# Patient Record
Sex: MALE | Race: WHITE | HISPANIC OR LATINO | URBAN - METROPOLITAN AREA
[De-identification: names, ages, dates, MRNs, and addresses within clinical notes are randomized per-mention and may not be internally consistent; named-entity substitution may affect disease eponyms.]

---

## 2022-12-27 ENCOUNTER — OFFICE VISIT (OUTPATIENT)
Dept: URGENT CARE | Facility: URGENT CARE | Age: 47
End: 2022-12-27
Payer: COMMERCIAL

## 2022-12-27 VITALS
WEIGHT: 134 LBS | HEART RATE: 77 BPM | OXYGEN SATURATION: 98 % | DIASTOLIC BLOOD PRESSURE: 94 MMHG | SYSTOLIC BLOOD PRESSURE: 156 MMHG | RESPIRATION RATE: 16 BRPM | TEMPERATURE: 98 F

## 2022-12-27 DIAGNOSIS — J10.1 INFLUENZA B: Primary | ICD-10-CM

## 2022-12-27 DIAGNOSIS — J06.9 VIRAL URI: ICD-10-CM

## 2022-12-27 LAB
DEPRECATED S PYO AG THROAT QL EIA: NEGATIVE
FLUAV AG SPEC QL IA: NEGATIVE
FLUBV AG SPEC QL IA: POSITIVE
GROUP A STREP BY PCR: NOT DETECTED
SARS-COV-2 RNA RESP QL NAA+PROBE: NEGATIVE

## 2022-12-27 PROCEDURE — 87651 STREP A DNA AMP PROBE: CPT | Performed by: FAMILY MEDICINE

## 2022-12-27 PROCEDURE — 87804 INFLUENZA ASSAY W/OPTIC: CPT | Performed by: FAMILY MEDICINE

## 2022-12-27 PROCEDURE — U0003 INFECTIOUS AGENT DETECTION BY NUCLEIC ACID (DNA OR RNA); SEVERE ACUTE RESPIRATORY SYNDROME CORONAVIRUS 2 (SARS-COV-2) (CORONAVIRUS DISEASE [COVID-19]), AMPLIFIED PROBE TECHNIQUE, MAKING USE OF HIGH THROUGHPUT TECHNOLOGIES AS DESCRIBED BY CMS-2020-01-R: HCPCS | Performed by: FAMILY MEDICINE

## 2022-12-27 PROCEDURE — 99203 OFFICE O/P NEW LOW 30 MIN: CPT | Performed by: FAMILY MEDICINE

## 2022-12-27 PROCEDURE — U0005 INFEC AGEN DETEC AMPLI PROBE: HCPCS | Performed by: FAMILY MEDICINE

## 2022-12-27 RX ORDER — BENZONATATE 200 MG/1
200 CAPSULE ORAL 3 TIMES DAILY PRN
Qty: 21 CAPSULE | Refills: 0 | Status: SHIPPED | OUTPATIENT
Start: 2022-12-27 | End: 2023-01-03

## 2022-12-27 NOTE — PROGRESS NOTES
SUBJECTIVE: Enrique Howe is a 47 year old male presenting with a chief complaint of nasal congestion and cough .  Onset of symptoms was 1 week(s) ago.    No past medical history on file.  Not on File  Social History     Tobacco Use     Smoking status: Not on file     Smokeless tobacco: Not on file   Substance Use Topics     Alcohol use: Not on file       ROS:  SKIN: no rash  GI: no vomiting    OBJECTIVE:  BP (!) 156/94   Pulse 77   Temp 98  F (36.7  C) (Oral)   Resp 16   Wt 60.8 kg (134 lb)   SpO2 98% GENERAL APPEARANCE: healthy, alert and no distress  EYES: EOMI,  PERRL, conjunctiva clear  HENT: ear canals and TM's normal.  Nose and mouth without ulcers, erythema or lesions  RESP: lungs clear to auscultation - no rales, rhonchi or wheezes  SKIN: no suspicious lesions or rashes      ICD-10-CM    1. Influenza B  J10.1 benzonatate (TESSALON) 200 MG capsule      2. Viral URI  J06.9 Symptomatic COVID-19 Virus (Coronavirus) by PCR Nose     Influenza A & B Antigen - Clinic Collect     Streptococcus A Rapid Screen w/Reflex to PCR - Clinic Collect     Group A Streptococcus PCR Throat Swab          Fluids/Rest, f/u if worse/not any better

## 2025-07-07 ENCOUNTER — OFFICE VISIT (OUTPATIENT)
Dept: URGENT CARE | Facility: URGENT CARE | Age: 50
End: 2025-07-07
Payer: COMMERCIAL

## 2025-07-07 VITALS
OXYGEN SATURATION: 98 % | SYSTOLIC BLOOD PRESSURE: 126 MMHG | TEMPERATURE: 100 F | HEART RATE: 103 BPM | RESPIRATION RATE: 18 BRPM | DIASTOLIC BLOOD PRESSURE: 79 MMHG | WEIGHT: 154.2 LBS

## 2025-07-07 DIAGNOSIS — R50.9 FEVER IN ADULT: ICD-10-CM

## 2025-07-07 DIAGNOSIS — J02.9 ACUTE PHARYNGITIS, UNSPECIFIED ETIOLOGY: Primary | ICD-10-CM

## 2025-07-07 LAB
DEPRECATED S PYO AG THROAT QL EIA: NEGATIVE
S PYO DNA THROAT QL NAA+PROBE: NOT DETECTED

## 2025-07-07 PROCEDURE — 3074F SYST BP LT 130 MM HG: CPT

## 2025-07-07 PROCEDURE — 87635 SARS-COV-2 COVID-19 AMP PRB: CPT

## 2025-07-07 PROCEDURE — 3078F DIAST BP <80 MM HG: CPT

## 2025-07-07 PROCEDURE — 87651 STREP A DNA AMP PROBE: CPT

## 2025-07-07 PROCEDURE — 99213 OFFICE O/P EST LOW 20 MIN: CPT

## 2025-07-07 RX ORDER — AMOXICILLIN 500 MG/1
500 CAPSULE ORAL 2 TIMES DAILY
Qty: 20 CAPSULE | Refills: 0 | Status: SHIPPED | OUTPATIENT
Start: 2025-07-07 | End: 2025-07-07

## 2025-07-07 RX ORDER — AMOXICILLIN 500 MG/1
1000 CAPSULE ORAL DAILY
Qty: 20 CAPSULE | Refills: 0 | Status: SHIPPED | OUTPATIENT
Start: 2025-07-07

## 2025-07-07 RX ORDER — AMOXICILLIN 500 MG/1
1000 CAPSULE ORAL DAILY
Qty: 20 CAPSULE | Refills: 0 | Status: SHIPPED | OUTPATIENT
Start: 2025-07-07 | End: 2025-07-07

## 2025-07-07 NOTE — LETTER
2025    Enrique Hwoe   1975        To Whom it May Concern;    Please excuse Enrique Howe from work/school for a healthcare visit on 2025. Additionally, please excuse him from work so that he can recover from his illness. I would allow him to return to work on 7/10.     Call with any questions or concerns.       Sincerely,          Jose Groves, DO

## 2025-07-07 NOTE — PATIENT INSTRUCTIONS
Thank you for trusting us with your care. As we discussed;    1)  amoxicillin, take 2 tablets once a day (with breakfast) for 10 days.    2) We will call with the COVID test result    3) Take tylenol 1000mg every 8 hours, or Ibuprofen 600mg every 8 hours - Can alternate, take tylenol and then 4 hours later take ibuprofen and then 4 hours late take tylenol    4) If you are not improving 24-48 hours after starting antibiotics, please be seen again.    Please feel free to call the clinic with any questions or concerns, or send a [employer] message and we will get back to you as soon as we can.     Jose Groves, DO

## 2025-07-07 NOTE — PROGRESS NOTES
"Assessment & Plan     Acute pharyngitis, unspecified etiology  Fever in adult  Cough  Erythematous posterior oropharynx with exudates present, rapid strep negative however given clinical suspicion we will treat as acute pharyngitis with amoxicillin.  Provided return precautions and conservative therapies and patient instructions.  - Streptococcus A Rapid Screen w/Reflex to PCR - Clinic Collect  - COVID-19 Virus (Coronavirus) by PCR Nose  - Group A Streptococcus PCR Throat Swab    - amoxicillin (AMOXIL) 500 MG capsule  Dispense: 20 capsule; Refill: 0       Return if symptoms worsen or fail to improve, for Follow up.    Jose Groves DO  [facility] [place] Nunez is a 50 year old male who presents to clinic today for the following health issues:  Chief Complaint   Patient presents with    Urgent Care    Cough     Pt states he was working under the heat outside Thursday as he is a . Since pt has been feeling ill, running a fever, having upper back pain, slight cough, and HA. Tx otc- Tylenol         7/7/2025     4:18 PM   Additional Questions   Roomed by Dawb   Accompanied by Spouse         7/7/2025   Forms   Any forms needing to be completed Yes     HPI    URI Adult    Onset of symptoms was 4 day(s) ago.  Course of illness is worsening.    Severity moderate  Current and Associated symptoms: sore throat, body aches, and fatigue  Treatment measures tried include Tylenol/Ibuprofen, Fluids, and Rest.  Predisposing factors include None.    Started Thursday, then felt better this morning with resolution of fever, then went back to work and fever returned  Was feeling better, now feeling worse   Appetite has worsened alongside fever  Denies sore throat (maybe a little bit today), no cough, no runny nose  Ears feel \"warm\" but no pain  Worst symptoms is feeling weak          Objective    BP (!) 150/76   Pulse 103   Temp (!) 101.7  F (38.7  C) (Tympanic)   Resp 18   Wt " 69.9 kg (154 lb 3.2 oz)   SpO2 98%   Physical Exam   GENERAL: alert and no distress  HENT: Erythematous posterior oropharynx with exudates.  Ear canals and TM's normal, nose and mouth without ulcers or lesions  NECK: no adenopathy, no asymmetry, masses, or scars  RESP: lungs clear to auscultation - no rales, rhonchi or wheezes  CV: regular rate and rhythm, normal S1 S2, no S3 or S4, no murmur, click or rub, no peripheral edema  ABDOMEN: soft, nontender, no hepatosplenomegaly, no masses and bowel sounds normal  MS: no gross musculoskeletal defects noted, no edema

## 2025-07-07 NOTE — PROGRESS NOTES
Urgent Care Clinic Visit    Chief Complaint   Patient presents with    Urgent Care    Cough     Pt states he was working under the heat outside Thursday as he is a . Since pt has been feeling ill, running a fever, having upper back pain, slight cough, and HA. Tx otc- Tylenol              7/7/2025     4:18 PM   Additional Questions   Roomed by Ck   Accompanied by Spouse         7/7/2025   Forms   Any forms needing to be completed Yes     Does the patient have a sore throat and either history of fever >100.4 in the previous 24 hours or recent exposure to a known case of strep throat? Yes  Does the patient have a productive cough that started within the past 7 days? No

## 2025-07-08 LAB — SARS-COV-2 RNA RESP QL NAA+PROBE: NEGATIVE

## 2025-07-10 ENCOUNTER — OFFICE VISIT (OUTPATIENT)
Dept: URGENT CARE | Facility: URGENT CARE | Age: 50
End: 2025-07-10
Payer: COMMERCIAL

## 2025-07-10 ENCOUNTER — ANCILLARY PROCEDURE (OUTPATIENT)
Dept: GENERAL RADIOLOGY | Facility: CLINIC | Age: 50
End: 2025-07-10
Attending: PHYSICIAN ASSISTANT
Payer: COMMERCIAL

## 2025-07-10 VITALS
OXYGEN SATURATION: 95 % | WEIGHT: 151.5 LBS | TEMPERATURE: 98.8 F | DIASTOLIC BLOOD PRESSURE: 90 MMHG | SYSTOLIC BLOOD PRESSURE: 151 MMHG | RESPIRATION RATE: 16 BRPM | HEART RATE: 94 BPM

## 2025-07-10 DIAGNOSIS — J18.9 PNEUMONIA OF RIGHT MIDDLE LOBE DUE TO INFECTIOUS ORGANISM: Primary | ICD-10-CM

## 2025-07-10 DIAGNOSIS — R05.1 ACUTE COUGH: ICD-10-CM

## 2025-07-10 RX ORDER — AZITHROMYCIN 250 MG/1
TABLET, FILM COATED ORAL
Qty: 6 TABLET | Refills: 0 | Status: SHIPPED | OUTPATIENT
Start: 2025-07-10 | End: 2025-07-15

## 2025-07-10 ASSESSMENT — ENCOUNTER SYMPTOMS
FEVER: 1
COUGH: 1

## 2025-07-10 NOTE — LETTER
July 10, 2025      Enrique Howe  [address] [facility][[address]] [address]        To Whom It May Concern:    Enrique Howe  was seen on 07/10/25.  Please excuse him from work until 07/15/2025 due to illness.        Sincerely,        Saundra Bro PA-C    Electronically signed

## 2025-07-10 NOTE — PROGRESS NOTES
Urgent Care Clinic Visit    Chief Complaint   Patient presents with    Urgent Care    Fever     Fever, x3 days. Was seen Monday for high fever, got amoxicillin and still having fever, ibuprofen and tylenol as needed every 4 hours.               7/10/2025     4:41 PM   Additional Questions   Roomed by Marcie Bullock   Accompanied by wife

## 2025-07-10 NOTE — PROGRESS NOTES
Assessment & Plan        1. Pneumonia of right middle lobe due to infectious organism (Primary)    -Advised patient to stop taking amoxicillin.  He will start Augmentin twice daily for 7 days and azithromycin as directed.  Patient follow-up in the clinic if symptoms are not getting better in the next 2-3 days  - azithromycin (ZITHROMAX) 250 MG tablet; Take 2 tablets (500 mg) by mouth daily for 1 day, THEN 1 tablet (250 mg) daily for 4 days.  Dispense: 6 tablet; Refill: 0  - amoxicillin-clavulanate (AUGMENTIN) 875-125 MG tablet; Take 1 tablet by mouth 2 times daily for 7 days.  Dispense: 14 tablet; Refill: 0    2. Acute cough    - XR Chest 2 Views pneumonia of the right middle lobe per radiology read    Results for orders placed or performed in visit on 07/10/25   XR Chest 2 Views     Status: None    Narrative    EXAM: XR CHEST 2 VIEWS  LOCATION: [facility] [place]  DATE: 7/10/2025    INDICATION: cough and fever  COMPARISON: None.      Impression    IMPRESSION: Large area of consolidation in the right middle lobe typical of pneumonia. Follow-up to resolution recommended. Left lung clear. No effusions.     '  Patient Instructions   Expect to see improvement of symptoms in 2-3 after starting antibiotics. Follow up in the clinic or ED if symptoms worsen. Complete resolution of symptoms expected after 7 days      Return if symptoms worsen or fail to improve, for Follow up.    At the end of the encounter, I discussed results, diagnosis, medications. Discussed red flags for immediate return to clinic/ER, as well as indications for follow up if no improvement. Patient and wife understood and agreed to plan. Patient was stable for discharge.    Pako Nunez is a 50 year old male who presents to clinic today with wife for the following health issues:  Chief Complaint   Patient presents with    Urgent Care    Fever     Fever, x3 days, unproductive cough x1day. Was seen Monday for high fever, got  amoxicillin and still having fever, ibuprofen and tylenol as needed every 4 hours.      [facility]    IInterpretation services were used during the visit.  Patient reports a 3-day history of fever, initially evaluated on Monday. Temp was 101.5 F at home. At that visit, he was prescribed amoxicillin 1000 mg daily for 10 days. Both rapid and PCR strep tests were negative. He reports that the fever briefly improved for one day but has since returned and persisted, although he is afebrile today. He now reports a new unproductive cough that started yesterday. He continues to have a sore throat. A COVID-19 test was negative. He is requesting evaluation of his lungs.    Review of Systems   Constitutional:  Positive for fever.   Respiratory:  Positive for cough.        Problem List:  There are no relevant problems documented for this patient.      No past medical history on file.    Social History     Tobacco Use    Smoking status: Never    Smokeless tobacco: Never   Substance Use Topics    Alcohol use: Not on file           Objective    BP (!) 151/90   Pulse 94   Temp 98.8  F (37.1  C) (Tympanic)   Resp 16   Wt 68.7 kg (151 lb 8 oz)   SpO2 95%   Physical Exam  Constitutional:       Appearance: Normal appearance.   HENT:      Head: Normocephalic.      Mouth/Throat:      Mouth: Mucous membranes are moist.      Pharynx: Uvula midline. Posterior oropharyngeal erythema present.   Cardiovascular:      Rate and Rhythm: Normal rate and regular rhythm.   Pulmonary:      Effort: Pulmonary effort is normal.      Breath sounds: Normal breath sounds.   Lymphadenopathy:      Head:      Right side of head: No submental, submandibular or tonsillar adenopathy.      Left side of head: No submental, submandibular or tonsillar adenopathy.      Cervical: No cervical adenopathy.      Right cervical: No superficial cervical adenopathy.     Left cervical: No superficial cervical adenopathy.   Skin:     General: Skin is warm and dry.      Findings:  No rash.   Neurological:      Mental Status: He is alert.   Psychiatric:         Mood and Affect: Mood normal.         Behavior: Behavior normal.              Saundra Bro PA-C

## 2025-07-15 ENCOUNTER — OFFICE VISIT (OUTPATIENT)
Dept: URGENT CARE | Facility: URGENT CARE | Age: 50
End: 2025-07-15
Payer: COMMERCIAL

## 2025-07-15 VITALS
OXYGEN SATURATION: 98 % | DIASTOLIC BLOOD PRESSURE: 79 MMHG | RESPIRATION RATE: 18 BRPM | HEART RATE: 87 BPM | SYSTOLIC BLOOD PRESSURE: 140 MMHG | WEIGHT: 143.7 LBS | TEMPERATURE: 99.1 F

## 2025-07-15 DIAGNOSIS — J18.9 PNEUMONIA OF RIGHT MIDDLE LOBE DUE TO INFECTIOUS ORGANISM: Primary | ICD-10-CM

## 2025-07-15 DIAGNOSIS — R19.7 DIARRHEA, UNSPECIFIED TYPE: ICD-10-CM

## 2025-07-15 PROCEDURE — 99213 OFFICE O/P EST LOW 20 MIN: CPT

## 2025-07-15 PROCEDURE — T1013 SIGN LANG/ORAL INTERPRETER: HCPCS

## 2025-07-15 PROCEDURE — 3078F DIAST BP <80 MM HG: CPT

## 2025-07-15 PROCEDURE — 3077F SYST BP >= 140 MM HG: CPT

## 2025-07-15 NOTE — PATIENT INSTRUCTIONS
Thank you for trusting us with your care. As we discussed;    1) Your lungs sound good, and you are improving. Make sure you finish your antibiotics.    2) If you develop fevers again, please be seen.    3) It can take 2-6 weeks to completely resolve cough.     4) Diarrhea should improve once you finish antibiotics. You can try probiotic drinks and supplements after your antibiotic course.    Please feel free to call the clinic with any questions or concerns, or send a [employer] message and we will get back to you as soon as we can.

## 2025-07-15 NOTE — LETTER
7/15/2025    Enrique Howe   1975        To Whom it May Concern;    Please excuse Enrique Howe from work/school for a healthcare visit on Jul 15, 2025. Additionally, please excuse him from work until 2025 as he recovers from a significant illness.    Call with any questions or concerns.    Sincerely,        Jose Groves, DO

## 2025-07-15 NOTE — PROGRESS NOTES
Assessment & Plan     There are no diagnoses linked to this encounter.   {2021 E&M time (Optional):003354}    {Provider  Link to [facility] Help Grid :492484}    No follow-ups on file.    Jose Groves DO  [facility] [place] Nunez is a 50 year old male who presents to clinic today for the following health issues:  Chief Complaint   Patient presents with    Urgent Care    URI     Multiple visits to urgent care on 7/7 and 7/10 dx with Pneumonia - Pt presents to clinic with follow up to have lungs checked, worsening cough  Fever has improved, headache, loss of appetite, -10 lb weight loss  Diarrhea, unsure if its related to medication  Took Tylenol 600 mg around 9 AM          7/15/2025    12:08 PM   Additional Questions   Roomed by AF   Accompanied by self     HPI      Feeling better  Still has a cough  Cold air bothers him, causes cough  Overall fatigue and   Wants to check his lungs again,   No more fevers  Diarrhea since starting antibiotics, feels that appetite isn't 100% yet, wondering if there is medication that can improve his appetite      ***  {UC Conditions (Optional):926285}    Review of Systems  {ROS COMP (Optional):692831}      Objective    BP (!) 140/79   Pulse 87   Temp 99.1  F (37.3  C) (Oral)   Resp 18   Wt 65.2 kg (143 lb 11.2 oz)   SpO2 98%   Physical Exam   {Exam List (Optional):221213}    {Diagnostic Test Results (Optional):186481}

## 2025-07-15 NOTE — PROGRESS NOTES
Urgent Care Clinic Visit    Chief Complaint   Patient presents with    Urgent Care    URI     Multiple visits to urgent care on 7/7 and 7/10 dx with Pneumonia - Pt presents to clinic with follow up to have lungs checked, worsening cough  Fever has improved, headache, loss of appetite, -10 lb weight loss  Diarrhea, unsure if its related to medication  Took Tylenol 600 mg around 9 AM               7/15/2025    12:08 PM   Additional Questions   Roomed by AF   Accompanied by self

## 2025-07-15 NOTE — PROGRESS NOTES
Assessment & Plan     Pneumonia of right middle lobe due to infectious organism  Clinically improved, clear respiratory exam, afebrile vitally stable saturating well on room air.  Patient followed up because he was concerned that he ran out of 1 antibiotic and not the other, I counseled him that this was planned, and to finish his Augmentin as directed.  Provided [place] cough for symptomatic cough relief at night.  Advised him that postinflammatory cough may persist anywhere from 2 to 6 weeks, and to monitor for worsening symptoms or return of fever as this would require reevaluation.  No indication for imaging today.    - dextromethorphan (TUSSIN COUGH) 15 MG/5ML syrup  Dispense: 118 mL; Refill: 0    Diarrhea, unspecified type  Likely antibiotic associated, no abdominal pain or fevers or watery stools, do not suspect C. difficile at this time.  Advised that he take probiotics following his antibiotic course to restore gut stephanie.     Return if symptoms worsen or fail to improve, for Follow up.    Jose Groves DO  [facility] Nunez is a 50 year old male who presents to clinic today for the following health issues:  Chief Complaint   Patient presents with    Urgent Care    URI     Multiple visits to urgent care on 7/7 and 7/10 dx with Pneumonia - Pt presents to clinic with follow up to have lungs checked, worsening cough  Fever has improved, headache, loss of appetite, -10 lb weight loss  Diarrhea, unsure if its related to medication  Took Tylenol 600 mg around 9 AM          7/15/2025    12:08 PM   Additional Questions   Roomed by AF   Accompanied by self     HPI  Following up after diagnosis of pneumonia in urgent care on 7/10, given azithromycin and Augmentin.  Chest x-ray showed RML consolidation.  Since starting the antibiotics his fevers have resolved and he is feeling improved.  Still has a slight cough.  Appetite is slow to improve.  Having some diarrhea since starting  antibiotics, no bloody or melanotic stools, no abdominal pain, no N/V.  Completed azithromycin, 2 days of Augmentin remaining.      Objective    BP (!) 140/79   Pulse 87   Temp 99.1  F (37.3  C) (Oral)   Resp 18   Wt 65.2 kg (143 lb 11.2 oz)   SpO2 98%   Physical Exam   GENERAL: alert and no distress  NECK: no adenopathy, no asymmetry, masses, or scars  RESP: lungs clear to auscultation - no rales, rhonchi or wheezes.  Coughing elicited with deep respiration.  CV: regular rate and rhythm, normal S1 S2, no S3 or S4, no murmur, click or rub, no peripheral edema  ABDOMEN: soft, nontender, no hepatosplenomegaly, no masses and bowel sounds normal

## 2025-07-21 ENCOUNTER — OFFICE VISIT (OUTPATIENT)
Dept: URGENT CARE | Facility: URGENT CARE | Age: 50
End: 2025-07-21
Payer: COMMERCIAL

## 2025-07-21 VITALS
OXYGEN SATURATION: 97 % | WEIGHT: 144.2 LBS | SYSTOLIC BLOOD PRESSURE: 134 MMHG | DIASTOLIC BLOOD PRESSURE: 81 MMHG | TEMPERATURE: 98.3 F | RESPIRATION RATE: 19 BRPM | HEART RATE: 99 BPM

## 2025-07-21 DIAGNOSIS — R05.1 ACUTE COUGH: ICD-10-CM

## 2025-07-21 DIAGNOSIS — R07.89 CHEST WALL PAIN: ICD-10-CM

## 2025-07-21 DIAGNOSIS — J18.9 PNEUMONIA OF RIGHT MIDDLE LOBE DUE TO INFECTIOUS ORGANISM: Primary | ICD-10-CM

## 2025-07-21 PROCEDURE — 3079F DIAST BP 80-89 MM HG: CPT

## 2025-07-21 PROCEDURE — 3075F SYST BP GE 130 - 139MM HG: CPT

## 2025-07-21 PROCEDURE — 99214 OFFICE O/P EST MOD 30 MIN: CPT

## 2025-07-21 RX ORDER — BENZONATATE 100 MG/1
100 CAPSULE ORAL 3 TIMES DAILY PRN
Qty: 30 CAPSULE | Refills: 0 | Status: SHIPPED | OUTPATIENT
Start: 2025-07-21

## 2025-07-21 RX ORDER — NAPROXEN 500 MG/1
500 TABLET ORAL 2 TIMES DAILY WITH MEALS
Qty: 28 TABLET | Refills: 0 | Status: SHIPPED | OUTPATIENT
Start: 2025-07-21 | End: 2025-08-04

## 2025-07-21 NOTE — LETTER
July 21, 2025      Enrique Howe  [address] [facility] [address]        To Whom It May Concern:    Enrique Howe was seen in our clinic. I recommend he remain off work starting 7/21/2025 or on light duty (sharri work, no heavy lifting) for a week starting 7/21/2025 due to his condition. He will need follow-up with a primary care provider for further restrictions.    Sincerely,        Consuelo Flynn MD on 7/21/2025 at 11:54 AM

## 2025-07-21 NOTE — Clinical Note
July 21, 2025      Enrique Howe  [address]  [facility] [address]        To Whom It May Concern:    Enrique Howe was seen in our clinic. He may return to work with the following: {work restrictions for clinic work note:567632} on or about ***.      Sincerely,      Consuelo Flynn        Electronically signed

## 2025-07-21 NOTE — PROGRESS NOTES
Urgent Care Clinic Visit    Chief Complaint   Patient presents with    Urgent Care    URI     Chest and upper back pain(when breathing), SOB, cough, x2 weeks.   Pt was here 10 days ago and got prescribed Azithromycin 250 mg and amoxicillin 500 MG, finished medications.                7/21/2025    11:17 AM   Additional Questions   Roomed by Marcie Kobe   Accompanied by daughter         Assessment & Plan     Pneumonia of right middle lobe due to infectious organism  Acute cough  Clinically improved after completing antibiotics. Likely residual cough from post-infectious inflammation, cough causing chest wall pain and neck pain. Lung sounds clear, likely that his PNA is resolved and do not think an image would be helpful here. Do think that the cough is postinflammatory, discussed expectant management for this and the fact that this may take up to 6 weeks to resolve.  Recommend that he rest and take it easy for the next couple of weeks to help facilitate healing.  Will prescribe tessalon pearls 3 times daily as needed for cough.  Recommend follow-up with PCP.  Note written today; patient and daughter understand they will need to follow-up with PCP for further work restrictions. Strict ED precautions discussed..  Strict ED precautions discussed with patient and daughter, both are agreeable.  - benzonatate (TESSALON) 100 MG capsule  Dispense: 30 capsule; Refill: 0    Chest wall pain  Likely due to to his ongoing cough secondary to his pneumonia.  Will prescribe naproxen to help with symptoms.  Strict ED precautions discussed.  - naproxen (NAPROSYN) 500 MG tablet  Dispense: 28 tablet; Refill: 0       No follow-ups on file.    Consuelo Flynn MD  [facility] Nunez is a 50 year old male who presents to clinic today for the following health issues:  Chief Complaint   Patient presents with    Urgent Care    URI     Chest and upper back pain(when breathing), SOB, cough, x2 weeks.   Pt  was here 10 days ago and got prescribed Azithromycin 250 mg and amoxicillin 500 MG, finished medications.          7/21/2025    11:17 AM   Additional Questions   Roomed by Marcie Bullock   Accompanied by daughter     LOLI    Here for chest wall pain  Recently diagnosed with PNA here at urgent care  Per provider note 7/15/2025:  Pneumonia of right middle lobe due to infectious organism  Clinically improved, clear respiratory exam, afebrile vitally stable saturating well on room air.  Patient followed up because he was concerned that he ran out of 1 antibiotic and not the other, I counseled him that this was planned, and to finish his Augmentin as directed.  Provided [place] cough for symptomatic cough relief at night.  Advised him that postinflammatory cough may persist anywhere from 2 to 6 weeks, and to monitor for worsening symptoms or return of fever as this would require reevaluation.  No indication for imaging today.    - dextromethorphan (TUSSIN COUGH) 15 MG/5ML syrup  Dispense: 118 mL; Refill: 0    Today's primary complaint is that he feels his chest and back of the neck hurt  More like a heaviness rather than a pain  Feels tight, uncomfortable but does not feel crushing substernal pain. Feels some tightness in the airways  Sputum color is clear, improving  Cough is definitely decreased and improving  No palpitations or rapid heartbeat noted, no exertional chest pain  Mostly straining with exercise. Chest wall mostly superficial with some muscle pain at the neck. No pain with range of motion at the neck  No longer with fever or chills  No runny nose  No pain in ears  Taking dextromethorphan, Tylenol for symptomatic management      Review of Systems  Constitutional, HEENT, cardiovascular, pulmonary, GI, , musculoskeletal, neuro, skin, endocrine and psych systems are negative, except as otherwise noted.      Objective    /81   Pulse 99   Temp 98.3  F (36.8  C) (Tympanic)   Resp 19   Wt 65.4 kg (144 lb  3.2 oz)   SpO2 97%   Physical Exam   GENERAL: alert and no distress. Appears well.  HENT: normal cephalic/atraumatic, ear canals and TM's normal, nose and mouth without ulcers or lesions, oropharynx clear with mild erythema, and oral mucous membranes moist  NECK: no adenopathy, no asymmetry, masses, or scars  RESP: lungs clear to auscultation - no rales, rhonchi or wheezes.  Dry cough when taking deep breath.  CV: regular rate and rhythm, normal S1 S2, no S3 or S4, no murmur, click or rub, no peripheral edema  MS: no gross musculoskeletal defects noted, no edema